# Patient Record
Sex: FEMALE | Race: WHITE | NOT HISPANIC OR LATINO | Employment: UNEMPLOYED | URBAN - METROPOLITAN AREA
[De-identification: names, ages, dates, MRNs, and addresses within clinical notes are randomized per-mention and may not be internally consistent; named-entity substitution may affect disease eponyms.]

---

## 2017-11-08 ENCOUNTER — HOSPITAL ENCOUNTER (EMERGENCY)
Facility: MEDICAL CENTER | Age: 52
End: 2017-11-08

## 2017-11-08 VITALS
DIASTOLIC BLOOD PRESSURE: 66 MMHG | OXYGEN SATURATION: 96 % | RESPIRATION RATE: 14 BRPM | SYSTOLIC BLOOD PRESSURE: 124 MMHG | HEART RATE: 73 BPM | TEMPERATURE: 98.1 F

## 2017-11-08 PROCEDURE — 302449 STATCHG TRIAGE ONLY (STATISTIC)

## 2019-03-10 ENCOUNTER — OFFICE VISIT (OUTPATIENT)
Dept: URGENT CARE | Facility: PHYSICIAN GROUP | Age: 54
End: 2019-03-10
Payer: COMMERCIAL

## 2019-03-10 VITALS
OXYGEN SATURATION: 97 % | SYSTOLIC BLOOD PRESSURE: 110 MMHG | HEART RATE: 82 BPM | RESPIRATION RATE: 16 BRPM | WEIGHT: 170 LBS | DIASTOLIC BLOOD PRESSURE: 80 MMHG | TEMPERATURE: 97.9 F

## 2019-03-10 DIAGNOSIS — J06.9 VIRAL URI WITH COUGH: ICD-10-CM

## 2019-03-10 PROCEDURE — 99203 OFFICE O/P NEW LOW 30 MIN: CPT | Performed by: PHYSICIAN ASSISTANT

## 2019-03-10 RX ORDER — BENZONATATE 100 MG/1
100 CAPSULE ORAL 3 TIMES DAILY PRN
Qty: 30 CAP | Refills: 0 | Status: SHIPPED | OUTPATIENT
Start: 2019-03-10 | End: 2020-03-19

## 2019-03-10 RX ORDER — FLUTICASONE PROPIONATE 50 MCG
1 SPRAY, SUSPENSION (ML) NASAL DAILY
Qty: 16 G | Refills: 0 | Status: SHIPPED | OUTPATIENT
Start: 2019-03-10 | End: 2020-03-19

## 2019-03-10 RX ORDER — GUAIFENESIN 600 MG/1
600 TABLET, EXTENDED RELEASE ORAL EVERY 12 HOURS
Qty: 28 TAB | Refills: 0 | Status: SHIPPED | OUTPATIENT
Start: 2019-03-10 | End: 2020-03-19

## 2019-03-10 ASSESSMENT — ENCOUNTER SYMPTOMS
COUGH: 1
FEVER: 0
CHILLS: 1
SHORTNESS OF BREATH: 0
SORE THROAT: 1
MYALGIAS: 0
RHINORRHEA: 1

## 2019-03-10 ASSESSMENT — COPD QUESTIONNAIRES: COPD: 0

## 2019-03-10 NOTE — PROGRESS NOTES
Subjective:   Esha Hatfield is a 54 y.o. female who presents for Cough (congestion,sore throat,x 4 days,headche)        Cough   This is a new problem. The current episode started in the past 7 days. The problem has been gradually worsening. The problem occurs constantly. The cough is non-productive. Associated symptoms include chills, nasal congestion, postnasal drip, rhinorrhea and a sore throat. Pertinent negatives include no ear congestion, ear pain, fever, myalgias or shortness of breath. Nothing aggravates the symptoms. Treatments tried: alkaseltzer cold and flu. The treatment provided mild relief. Her past medical history is significant for asthma. There is no history of bronchitis, COPD, emphysema or pneumonia.     Review of Systems   Constitutional: Positive for chills. Negative for fever.   HENT: Positive for postnasal drip, rhinorrhea and sore throat. Negative for ear pain.    Respiratory: Positive for cough. Negative for shortness of breath.    Musculoskeletal: Negative for myalgias.       PMH: denies chronic medical problems  MEDS:fluoxetine, daily  ALLERGIES:   Allergies   Allergen Reactions   • Pcn [Penicillins]      As a child     SURGHX: right knee surgery and cholecystectomy  SOCHX:  reports that she has never smoked. She has never used smokeless tobacco.  FH: Family history was reviewed, no pertinent findings to report   Objective:   /80 (BP Location: Left arm, Patient Position: Sitting, BP Cuff Size: Large adult)   Pulse 82   Temp 36.6 °C (97.9 °F) (Temporal)   Resp 16   Wt 77.1 kg (170 lb)   SpO2 97%   Physical Exam   Constitutional: Vital signs are normal. She appears well-developed and well-nourished.  Non-toxic appearance. No distress.   HENT:   Head: Normocephalic and atraumatic.   Right Ear: Tympanic membrane, external ear and ear canal normal.   Left Ear: Tympanic membrane, external ear and ear canal normal.   Nose: Mucosal edema and rhinorrhea present. Right sinus exhibits  maxillary sinus tenderness. Left sinus exhibits maxillary sinus tenderness.   Mouth/Throat: Uvula is midline and mucous membranes are normal. Posterior oropharyngeal erythema present. No tonsillar exudate.   Significant congestion.    Eyes: Lids are normal.   Neck: Neck supple.   Cardiovascular: Normal rate, regular rhythm and normal heart sounds.  Exam reveals no gallop and no friction rub.    No murmur heard.  Pulmonary/Chest: Effort normal and breath sounds normal. No respiratory distress. She has no wheezes. She has no rales.   Neurological: She is alert.   Skin: Skin is warm and dry. Capillary refill takes less than 2 seconds.   Psychiatric: She has a normal mood and affect. Her speech is normal and behavior is normal. Judgment and thought content normal. Cognition and memory are normal.   Vitals reviewed.        Assessment/Plan:   1. Viral URI with cough  - fluticasone (FLONASE) 50 MCG/ACT nasal spray; Spray 1 Spray in nose every day.  Dispense: 16 g; Refill: 0  - guaiFENesin LA (MUCINEX) 600 MG TABLET SR 12 HR; Take 1 Tab by mouth every 12 hours.  Dispense: 28 Tab; Refill: 0  - benzonatate (TESSALON) 100 MG Cap; Take 1 Cap by mouth 3 times a day as needed for Cough.  Dispense: 30 Cap; Refill: 0      VSS, no dyspnea, no SOB, and lungs CTA on PE.  Consistent with viral URI without lower respiratory involvement. Goals of care include symptomatic control and prevention of lower respiratory spread. Signs of lower respiratory involvement discussed with pt.  Pt instructed to RTC if any of these are observed.     Drink plenty of fluids and rest.  Flonase 1 squirt in each nostril, as instructed in clinic, once a day.  Use nasal saline TID to promote drainage.   Salt water gurgles to soothe sore throat.  Start OTC expectorant.  APAP for fever control, and ibuprofen for throat pain/headache relief prn.    Try to use sudafed sparingly in order to allow sinuses to drain.  Avoid the longer formulations and try to take only  in the morning and/or at noon if needed.  If you fail to improve in 2-4 days or symptoms worsen/new symptoms develop, RTC for reevaluation.    Differential diagnosis, natural history, supportive care, and indications for immediate follow-up discussed.

## 2019-03-21 ENCOUNTER — OFFICE VISIT (OUTPATIENT)
Dept: DERMATOLOGY | Facility: IMAGING CENTER | Age: 54
End: 2019-03-21
Payer: COMMERCIAL

## 2019-03-21 VITALS — TEMPERATURE: 98.5 F | BODY MASS INDEX: 29.16 KG/M2 | HEIGHT: 65 IN | WEIGHT: 175 LBS

## 2019-03-21 DIAGNOSIS — D18.01 CHERRY ANGIOMA: ICD-10-CM

## 2019-03-21 DIAGNOSIS — D22.9 NEVUS: ICD-10-CM

## 2019-03-21 DIAGNOSIS — L91.8 SKIN TAG: ICD-10-CM

## 2019-03-21 PROBLEM — J30.2 SEASONAL ALLERGIES: Status: ACTIVE | Noted: 2019-03-21

## 2019-03-21 PROCEDURE — 99202 OFFICE O/P NEW SF 15 MIN: CPT | Performed by: DERMATOLOGY

## 2019-03-21 RX ORDER — FLUOXETINE HYDROCHLORIDE 20 MG/1
CAPSULE ORAL
Refills: 0 | COMMUNITY
Start: 2019-03-12 | End: 2021-01-29

## 2020-01-19 NOTE — PROGRESS NOTES
CC: mole check    Subjective: new patient here for mole check - few sites of concern.     Also discuss skin tag removal  HPI/location: possible mole on back  Time present: many years  Painful lesion: No  Itching lesion: No  Enlarging lesion: No  Anything make it better or worse?    History of skin cancer: No  History of precancers/actinic keratoses: No  History of biopsies:No  History of blistering/severe sunburns:No  Family history of skin cancer: Grandmother  Family history of atypical moles: Grandmother  Wears sunprotection regularly.    ROS: no fevers/chills. No itch.    DermPMH: no skin cancer/melanoma  No problem-specific Assessment & Plan notes found for this encounter.    Relevant PMH:seasonal allergies  Social:nonsmoker    PE: Gen:WDWN female in NAD.  UBE - face - cherry red vascular papule on right upper foreline.  Eyes/lips/neck/arms/chest/back - without rashes or suspicious lesions noted.  Skin-colored protuberant tag appearing lesions on right neck and right breast.  Few scattered hyperpigmented macules/papules, benign in appearance on torso/arms    A/P: Nevi: benign appearing:  -Reviewed ABCDEs  -Reviewed skin cancer detection/prevention  -RTC PRN growth/changes/concerning features    Cherry angioma: benign, face  -b/r    Skin tags, right neck/right breast likely:  -counseled re: dx/tx  -cosmetics visit $150/10 lesions treated PRN    I have reviewed medications relevant to my specialty.    PRN    Cosmetics: procedure  Skin tags, right neck and right breast, likely:  -counseled on diagnosis and treatment, including risks/benefits/alternatives of cyrotherapy  -LN2 25 sec X 1-2 cycles X 2lesions  -f/u if persists        
none

## 2020-03-19 ENCOUNTER — OFFICE VISIT (OUTPATIENT)
Dept: URGENT CARE | Facility: PHYSICIAN GROUP | Age: 55
End: 2020-03-19
Payer: COMMERCIAL

## 2020-03-19 VITALS
BODY MASS INDEX: 29.16 KG/M2 | TEMPERATURE: 97.5 F | OXYGEN SATURATION: 96 % | WEIGHT: 175 LBS | DIASTOLIC BLOOD PRESSURE: 82 MMHG | HEIGHT: 65 IN | SYSTOLIC BLOOD PRESSURE: 118 MMHG | RESPIRATION RATE: 20 BRPM | HEART RATE: 86 BPM

## 2020-03-19 DIAGNOSIS — B00.1 COLD SORE: Primary | ICD-10-CM

## 2020-03-19 DIAGNOSIS — J34.89 INTERNAL NASAL LESION: ICD-10-CM

## 2020-03-19 PROCEDURE — 99214 OFFICE O/P EST MOD 30 MIN: CPT | Performed by: PHYSICIAN ASSISTANT

## 2020-03-19 RX ORDER — VALACYCLOVIR HYDROCHLORIDE 1 G/1
2000 TABLET, FILM COATED ORAL 2 TIMES DAILY
Qty: 4 TAB | Refills: 2 | Status: SHIPPED | OUTPATIENT
Start: 2020-03-19 | End: 2020-03-20

## 2020-03-19 ASSESSMENT — ENCOUNTER SYMPTOMS
VOMITING: 0
FEVER: 0
ABDOMINAL PAIN: 0
COUGH: 0
DIARRHEA: 0
CONSTIPATION: 0
CHILLS: 0
NAUSEA: 0
SHORTNESS OF BREATH: 0

## 2020-03-19 NOTE — PROGRESS NOTES
"Subjective:   Esha Hatfield is a 55 y.o. female who presents for Methicillin-Resistant Staphylococcus Aureus (MRSA) (staph infection in nose, sore, itching  xfew days, pt states she has hx of this)        HPI   The patient presents to urgent care today with a sore in the left nose for the past 4 days.  The sore is painful, no discharge or drainage.  No fever, chills.  She has had multiple episodes in the past treated with Valtrex and mupirocin.  No cough, congestion, sore throat.  She has not tried to treat the area.    Review of Systems   Constitutional: Negative for chills, fever and malaise/fatigue.   HENT:        Positive nasal sore   Respiratory: Negative for cough and shortness of breath.    Gastrointestinal: Negative for abdominal pain, constipation, diarrhea, nausea and vomiting.   All other systems reviewed and are negative.      PMH:  has no past medical history on file.  MEDS:   Current Outpatient Medications:   •  valacyclovir (VALTREX) 1 GM Tab, Take 2 Tabs by mouth 2 times a day for 1 day., Disp: 4 Tab, Rfl: 2  •  mupirocin (BACTROBAN) 2 % Ointment, Apply 1 Application to affected area(s) 2 times a day for 5 days., Disp: 1 Tube, Rfl: 1  •  FLUoxetine (PROZAC) 20 MG Cap, TAKE ONE CAPSULE BY MOUTH EVERY DAY . NEEDS APPOINTMENT, Disp: , Rfl: 0  ALLERGIES:   Allergies   Allergen Reactions   • Pcn [Penicillins]      As a child     SURGHX:   Past Surgical History:   Procedure Laterality Date   • BREAST IMPLANT REVISION       SOCHX:  reports that she has never smoked. She has never used smokeless tobacco.  History reviewed. No pertinent family history.     Objective:   /82 (BP Location: Right arm, Patient Position: Sitting, BP Cuff Size: Large adult)   Pulse 86   Temp 36.4 °C (97.5 °F) (Temporal)   Resp 20   Ht 1.651 m (5' 5\")   Wt 79.4 kg (175 lb)   SpO2 96%   BMI 29.12 kg/m²     Physical Exam  Vitals signs reviewed.   Constitutional:       General: She is not in acute distress.     Appearance: " She is well-developed.   HENT:      Head: Normocephalic and atraumatic.      Right Ear: External ear normal.      Left Ear: External ear normal.      Nose: Nose normal.        Mouth/Throat:      Mouth: Mucous membranes are moist.   Eyes:      Conjunctiva/sclera: Conjunctivae normal.      Pupils: Pupils are equal, round, and reactive to light.   Neck:      Musculoskeletal: Normal range of motion and neck supple.      Trachea: No tracheal deviation.   Cardiovascular:      Rate and Rhythm: Normal rate and regular rhythm.   Pulmonary:      Effort: Pulmonary effort is normal.      Breath sounds: Normal breath sounds.   Skin:     General: Skin is warm and dry.      Capillary Refill: Capillary refill takes less than 2 seconds.   Neurological:      General: No focal deficit present.      Mental Status: She is alert and oriented to person, place, and time.   Psychiatric:         Mood and Affect: Mood normal.         Behavior: Behavior normal.           Assessment/Plan:     1. Cold sore  valacyclovir (VALTREX) 1 GM Tab   2. Internal nasal lesion  mupirocin (BACTROBAN) 2 % Ointment     Supportive care reviewed.  The patient will swab the inside of the right nose.    follow-up with primary care provider within 7-10 days.  If symptoms worsen or persist patient can return to clinic for reevaluation. All side effects of medication discussed including allergic response, GI upset, tendon injury, etc. Patient confirmed understanding of information.    Please note that this dictation was created using voice recognition software. I have made every reasonable attempt to correct obvious errors, but I expect that there are errors of grammar and possibly content that I did not discover before finalizing the note.

## 2020-05-29 ENCOUNTER — TELEMEDICINE (OUTPATIENT)
Dept: SLEEP MEDICINE | Facility: MEDICAL CENTER | Age: 55
End: 2020-05-29
Payer: COMMERCIAL

## 2020-05-29 VITALS — HEIGHT: 65 IN | WEIGHT: 165 LBS | BODY MASS INDEX: 27.49 KG/M2

## 2020-05-29 DIAGNOSIS — G47.34 NOCTURNAL HYPOXIA: ICD-10-CM

## 2020-05-29 DIAGNOSIS — G47.33 OSA (OBSTRUCTIVE SLEEP APNEA): ICD-10-CM

## 2020-05-29 PROCEDURE — 99204 OFFICE O/P NEW MOD 45 MIN: CPT | Mod: 95,CR | Performed by: INTERNAL MEDICINE

## 2020-05-29 NOTE — PROGRESS NOTES
"Telemedicine Visit: New Patient     This encounter was conducted via Platform ZOOM.  Verbal consent was obtained. Patient's identity was verified.    Subjective:     CC: suspected sleep apnea    Esha Hatfield is a 55 y.o. female presenting to establish care and to discuss the evaluation and management of suspected sleep apnea syndrome.  She has had nonrestorative sleep which she initially attributed to household stressors.  She is caring for both her grandmother with dementia, as well as her grandchildren.  She underwent screening overnight oximetry showing cyclical desaturations to a melissa of 75% for 51% of the night.    In terms of sleep habits, she gets to bed by 11 PM, struggling to fall asleep.  Once aleep she tends to sleep throughout the remainder of the night.  She has been told that she snores.  If she sleeps on her back she may wake up with palpitations, however she tends to sleep on her side.  She wakes up by 6:30 AM, feeling unrested.  She feels that she is \"out of energy\".  Her BMI is>27.  Denies tobacco, alcohol or recreational drug use.        ROS  See HPI  Constitutional: Negative for fever, chills and malaise/fatigue.   HENT: Negative for congestion.    Eyes: Negative for pain.   Respiratory: Negative for cough and shortness of breath.    Cardiovascular: Negative for leg swelling.   Gastrointestinal: Negative for nausea, vomiting, abdominal pain and diarrhea.   Genitourinary: Negative for dysuria and hematuria.   Skin: Negative for rash.   Neurological: Negative for dizziness, focal weakness and headaches.   Endo/Heme/Allergies: Does not bruise/bleed easily.   Psychiatric/Behavioral: Negative for depression.  The patient is not nervous/anxious.      Allergies   Allergen Reactions   • Dilaudid [Hydromorphone Hcl]    • Pcn [Penicillins]      As a child       Current medicines (including changes today)  Current Outpatient Medications   Medication Sig Dispense Refill   • FLUoxetine (PROZAC) 20 MG Cap " "TAKE ONE CAPSULE BY MOUTH EVERY DAY . NEEDS APPOINTMENT  0     No current facility-administered medications for this visit.        She  has a past medical history of Asthma, Chickenpox, and GERD (gastroesophageal reflux disease).  She  has a past surgical history that includes breast implant revision and arthroscopy, knee.      Family History   Problem Relation Age of Onset   • Cancer Mother      Family Status   Relation Name Status   • Mo         Patient Active Problem List    Diagnosis Date Noted   • Seasonal allergies 2019          Objective:   Ht 1.651 m (5' 5\")   Wt 74.8 kg (165 lb)   BMI 27.46 kg/m²     Physical Exam:  Constitutional: Alert, no distress, well-groomed.  Skin: No rashes in visible areas.  Eye: Round. Conjunctiva clear, lids normal. No icterus.   ENMT: Lips pink without lesions, good dentition, moist mucous membranes. Phonation normal.  Neck: No masses, no thyromegaly. Moves freely without pain.  CV: Pulse as reported by patient  Respiratory: Unlabored respiratory effort, no cough or audible wheeze  Psych: Alert and oriented x3, normal affect and mood.       Assessment and Plan:   The following treatment plan was discussed:     1. GAMALIEL (obstructive sleep apnea)  - Overnight Home Sleep Study; Future    2. BMI 27.0-27.9,adult    3. Nocturnal hypoxia    The patient has snoring, nonrestorative sleep and daytime somnolence, in the setting of obesity, and evidence of nocturnal desaturations, with high clinical suspicion for obstructive sleep apnea.  We will proceed with polysomnography for evaluation.  Follow-up: Return for after sleep study.         "

## 2020-06-01 ENCOUNTER — HOSPITAL ENCOUNTER (OUTPATIENT)
Dept: RADIOLOGY | Facility: MEDICAL CENTER | Age: 55
End: 2020-06-01
Payer: COMMERCIAL

## 2020-06-23 ENCOUNTER — HOSPITAL ENCOUNTER (OUTPATIENT)
Dept: RADIOLOGY | Facility: MEDICAL CENTER | Age: 55
End: 2020-06-23
Attending: FAMILY MEDICINE
Payer: COMMERCIAL

## 2020-06-23 DIAGNOSIS — Z12.31 VISIT FOR SCREENING MAMMOGRAM: ICD-10-CM

## 2020-06-23 PROCEDURE — 77067 SCR MAMMO BI INCL CAD: CPT

## 2020-07-13 ENCOUNTER — HOME STUDY (OUTPATIENT)
Dept: SLEEP MEDICINE | Facility: MEDICAL CENTER | Age: 55
End: 2020-07-13
Attending: INTERNAL MEDICINE
Payer: COMMERCIAL

## 2020-07-13 DIAGNOSIS — G47.33 OSA (OBSTRUCTIVE SLEEP APNEA): ICD-10-CM

## 2020-07-13 PROCEDURE — 95806 SLEEP STUDY UNATT&RESP EFFT: CPT | Performed by: FAMILY MEDICINE

## 2020-07-15 ENCOUNTER — TELEMEDICINE (OUTPATIENT)
Dept: SLEEP MEDICINE | Facility: MEDICAL CENTER | Age: 55
End: 2020-07-15
Payer: COMMERCIAL

## 2020-07-15 VITALS — WEIGHT: 175 LBS | BODY MASS INDEX: 29.16 KG/M2 | HEIGHT: 65 IN | TEMPERATURE: 97.5 F

## 2020-07-15 DIAGNOSIS — G47.34 NOCTURNAL HYPOXIA: ICD-10-CM

## 2020-07-15 DIAGNOSIS — G47.33 OSA (OBSTRUCTIVE SLEEP APNEA): ICD-10-CM

## 2020-07-15 PROCEDURE — 99213 OFFICE O/P EST LOW 20 MIN: CPT | Mod: 95,CR | Performed by: NURSE PRACTITIONER

## 2020-07-15 NOTE — PROGRESS NOTES
Telemedicine Visit: Established Patient     This encounter was conducted via Zoom .   Verbal consent was obtained. Patient's identity was verified.  Given the importance of social distancing and other strategies recommended to reduce the risk of COVID-19 transmission, I am providing medical care to this patient via audio/video visit in place of an in person visit at the request of the patient.      Subjective:   CC: GAMALIEL and sleep study results.     Esha Hatfield is a 55 y.o. female presenting for evaluation and management of GAMALIEL and sleep study results. PMH includes seasonal allergies, GAMALIEL, nocturnal hypoxia.    HSS from 7/13/20 was reviewed which indicated moderate obstructive sleep apnea with  Respiratory Event Index (ANUPAMA) of 18.2 per hour. Apneas: 3 (Obstructive apnea index 0.3/hr, Central apnea index 0.3 /hr, mixed 0.3 /hour), Hypopneas: 55. O2 Sat. melissa was 81%, avg O2 was 91 % and baseline O2 at 96 %. O2 sat was below 89% for 24 min of the flow evaluation time. Avg HR 67 BPM.    She goes to sleep between 10-11 pm and wakes up between 6-6:30 am.  She is able to sleep through the night however does wake up frequently in the morning feeling very tired.  Some mornings she feels as if she did not sleep at all through the night.  She denies morning headache but does report snoring.  She denies any new health problems or medications.    ROS   Denies any recent fevers or chills. No nausea or vomiting. No chest pains or shortness of breath.     Allergies   Allergen Reactions   • Dilaudid [Hydromorphone Hcl]    • Pcn [Penicillins]      As a child       Current medicines (including changes today)  Current Outpatient Medications   Medication Sig Dispense Refill   • FLUoxetine (PROZAC) 20 MG Cap TAKE ONE CAPSULE BY MOUTH EVERY DAY . NEEDS APPOINTMENT  0     No current facility-administered medications for this visit.        Patient Active Problem List    Diagnosis Date Noted   • Seasonal allergies 03/21/2019       Family  "History   Problem Relation Age of Onset   • Cancer Mother        She  has a past medical history of Asthma, Chickenpox, and GERD (gastroesophageal reflux disease).  She  has a past surgical history that includes breast implant revision and arthroscopy, knee.       Objective:   Temp 36.4 °C (97.5 °F)   Ht 1.651 m (5' 5\")   Wt 79.4 kg (175 lb)   BMI 29.12 kg/m²     Physical Exam  Constitutional: Alert, no distress, well-groomed.  Skin: No rashes in visible areas.  Eye: Round. Conjunctiva clear, lids normal. No icterus.   ENMT: Lips pink without lesions, good dentition, moist mucous membranes. Phonation normal.  Neck: Moves freely without pain.  CV: Pulse as reported by patient  Respiratory: Unlabored respiratory effort, no cough or audible wheeze  Psych: Alert and oriented x3, normal affect and mood.       Assessment and Plan:   The following treatment plan was discussed:     1. GAMALIEL (obstructive sleep apnea)  - DME CPAP    2. Nocturnal hypoxia    3. BMI 29.0-29.9,adult    1. HSS from 7/13/20 was reviewed which indicated moderate obstructive sleep apnea with  Respiratory Event Index (ANUPAMA) of 18.2 per hour. Apneas: 3 (Obstructive apnea index 0.3/hr, Central apnea index 0.3 /hr, mixed 0.3 /hour), Hypopneas: 55. O2 Sat. melissa was 81%, avg O2 was 91 % and baseline O2 at 96 %. O2 sat was below 89% for 24 min of the flow evaluation time. Avg HR 67 BPM.  Recommendation:  CPAP titration study vs Auto CPAP trial.   In general patients with sleep apnea are advised to avoid alcohol and sedatives and to not operate a motor vehicle while drowsy. In some cases alternative treatment options may prove effective in resolving sleep apnea in these options include upper airway surgery, the use of a dental orthotic or weight loss and positional therapy. Clinical correlation is required. These were reviewed with the patient.     2.  Patient is amenable to CPAP trial.  DME order for auto CPAP 5-15 cmH2O, mask (MOC) and supplies was " provided today. Continue to clean mask and supplies weekly, and change supplies per insurance guidelines.   3. Continue to stay active.   4. Follow up with the appropriate healthcare practitioners for all other medical problems and issues.      Follow-up: Return in about 3 months (around 10/15/2020).     CAMILLA Hugo.

## 2020-07-15 NOTE — PROCEDURES
DIAGNOSTIC HOME SLEEP TEST (HST) REPORT       PATIENT ID:  NAME:  Esha Hatfield  MRN:               [id number]  YOB: 1965  DATE OF STUDY: 7/13/2020      Impression:     This study shows evidence of:     1.Moderate obstructive sleep apnea with  Respiratory Event Index (ANUPAMA) of 18.2 per hour . These findings are based on the recording time (flow evaluation time).     2.  O2 Sat. melissa was 81%, avg O2 was 91 % and baseline O2 at 96 %. O2 sat was below 89% for 24 min of the flow evaluation time. Avg HR 67 BPM.      TECHNICAL DESCRIPTION:  ResMed Device used was a type-III home study device. Home sleep study recording included: Airflow recording by nasal pressure transducer; Respiratory Effort by abdominal Respiratory Bands; O2 by finger oximetry. A position sensor and a snore channel was also used.    Scoring Criteria: A modification of the the AASM Manual for the Scoring of Sleep and Associated Events. Obstructive apnea was scored by cessation of airflow for at least 10 seconds with continuing respiratory effort.  Central apnea was scored by cessation of airflow for at least 10 seconds with no effort.  Hypopnea was scored by a 30% or more reduction in airflow for at least 10 seconds accompanied by an arterial oxygen desaturation of 3% or more.  (For Medicare patients, hypopneas were scored by a 30% or more reduction in airflow for at least 10 seconds accompanied by an arterial oxygen saturation of 4% or more, as required by their insurance, CMS.        General sleep summary: . Total recording time is 202 minutes and total flow evaluation time is 191 minutes. The patient spent 76 minutes in the supine position.    Respiratory events:    Apneas: 3 (Obstructive apnea index 0.3/hr, Central apnea index 0.3 /hr, mixed 0.3 /hour)  Hypopneas: 55    Recommendations:    1. CPAP titration study vs Auto CPAP trial .   2.   In general patients with sleep apnea are advised to avoid alcohol and sedatives and to  not operate a motor vehicle while drowsy. In some cases alternative treatment options may prove effective in resolving sleep apnea in these options include upper airway surgery, the use of a dental orthotic or weight loss and positional therapy. Clinical correlation is required.         Chapis Reed MD

## 2020-09-16 ENCOUNTER — TELEPHONE (OUTPATIENT)
Dept: PULMONOLOGY | Facility: HOSPICE | Age: 55
End: 2020-09-16

## 2020-09-16 NOTE — TELEPHONE ENCOUNTER
Debora from [facility] called and stated that this patient is not in network with [facility].  Thank you.

## 2021-01-04 ENCOUNTER — TELEMEDICINE (OUTPATIENT)
Dept: SLEEP MEDICINE | Facility: MEDICAL CENTER | Age: 56
End: 2021-01-04
Payer: COMMERCIAL

## 2021-01-04 VITALS — HEIGHT: 66 IN | WEIGHT: 185 LBS | BODY MASS INDEX: 29.73 KG/M2

## 2021-01-04 DIAGNOSIS — G47.33 OSA (OBSTRUCTIVE SLEEP APNEA): ICD-10-CM

## 2021-01-04 PROCEDURE — 99213 OFFICE O/P EST LOW 20 MIN: CPT | Mod: 95,CR | Performed by: FAMILY MEDICINE

## 2021-01-04 NOTE — PROGRESS NOTES
Telemedicine Visit: Established Patient     This encounter was conducted via Zoom. Verbal consent was obtained. Patient's identity was verified.       Given the importance of social distancing and other strategies recommended to reduce the risk of COVID-19 transmission, I am providing medical care to this patient via audio/video visit in place of an in person visit at the request of the patient.    [facility] Follow Up Note     Date: 1/4/2021 / Time: 8:13 AM    Patient ID:   Name:             Esha Hatfield   YOB: 1965  Age:                 55 y.o.  female   MRN:               [id number]      Thank you for requesting a sleep medicine consultation on Esha Hatfield at the sleep center. She presents today with the chief complaints of GAMALIEL and 1st complianace follow up.     HISTORY OF PRESENT ILLNESS:       Pt is currently on ACPAP 5-15 cm. She goes to sleep around 10-11 pm and wakes up around 6-7 am. She is getting about 7 hrs of sleep on a good night and about 6 hr of sleep on a bad night. Overall,  She does finds her sleep refreshing since she has been on the CPAP. She molina not take regular naps. She denies any symptoms of RLS, narcolepsy or any symptoms to suggest parasomnias such as nightmares, sleep walking or acting out of dreams.She is using CPAP most days of the week. Pt reports 5 hrs of average nightly use of CPAP. Pt denies snoring, gasping,choking.Pt also denies significant mask leak that is interfering with sleep. The 30 day compliance was downloaded which shows adequate compliance with more that 4 hr usage about 80%. The AHI is has improved to 0.7/hr. The mask leak is normal. The symptoms of GAMALIEL has improved with the PAP therapy.     SLEEP HISTORY   HSS from 7/13/20 was reviewed which indicated moderate obstructive sleep apnea with  Respiratory Event Index (ANUPAMA) of 18.2 per hour. Apneas: 3 (Obstructive apnea index 0.3/hr, Central apnea index 0.3 /hr, mixed 0.3 /hour),  "Hypopneas: 55. O2 Sat. melissa was 81%, avg O2 was 91 % and baseline O2 at 96 %. O2 sat was below 89% for 24 min of the flow evaluation time. Avg HR 67 BPM.      REVIEW OF SYSTEMS:       Constitutional: Denies fevers, Denies weight changes  Eyes: Denies changes in vision, no eye pain  Ears/Nose/Throat/Mouth: Denies nasal congestion or sore throat   Cardiovascular: Denies chest pain or palpitations   Respiratory: Denies shortness of breath , Denies cough  Gastrointestinal/Hepatic: Denies abdominal pain, nausea, vomiting,   Musculoskeletal/Rheum: Denies  joint pain and swelling   Skin/Breast: Denies rash,   Neurological: Denies headache, confusion,   Sleep: denies snoring     Comprehensive review of systems form is reviewed with the patient and is attached in the EMR.     PMH:  has a past medical history of Asthma, Chickenpox, and GERD (gastroesophageal reflux disease).  MEDS:   Current Outpatient Medications:   •  FLUoxetine (PROZAC) 20 MG Cap, TAKE ONE CAPSULE BY MOUTH EVERY DAY . NEEDS APPOINTMENT, Disp: , Rfl: 0  ALLERGIES:   Allergies   Allergen Reactions   • Dilaudid [Hydromorphone Hcl]    • Pcn [Penicillins]      As a child     SURGHX:   Past Surgical History:   Procedure Laterality Date   • ARTHROSCOPY, KNEE     • BREAST IMPLANT REVISION       SOCHX:  reports that she has never smoked. She has never used smokeless tobacco. She reports current alcohol use. She reports that she does not use drugs..  FH:   Family History   Problem Relation Age of Onset   • Cancer Mother          Physical Exam:  Vitals/ General Appearance:   Weight/BMI: Body mass index is 29.86 kg/m².  Ht 1.676 m (5' 6\")   Wt 83.9 kg (185 lb)   Vitals:    01/04/21 0813   Weight: 83.9 kg (185 lb)   Height: 1.676 m (5' 6\")       Pt. is alert and oriented to time, place and person. Cooperative and in no apparent distress.       Constitutional: Alert, no distress, well-groomed.  Skin: No rashes in visible areas.  Eye: Round. Conjunctiva clear, lids " normal. No icterus.   ENMT: Lips pink without lesions, good dentition, moist mucous membranes. Phonation normal.  Neck: No masses, no thyromegaly. Moves freely without pain.  CV: Pulse as reported by patient  Respiratory: Unlabored respiratory effort, no cough or audible wheeze  Psych: Alert and oriented x3, normal affect and mood.     ASSESSMENT AND PLAN     1. Sleep Apnea .     The pathophysiology of sleep anea and the increased risk of cardiovascular morbidity from untreated sleep apnea is discussed in detail with the patient.     She is urged to avoid supine sleep, weight gain and alcoholic beverages since all of these can worsen sleep apnea. She is cautioned against drowsy driving. If She feels sleepy while driving, She must pull over for a break/nap, rather than persist on the road, in the interest of She own safety and that of others on the road.   Plan   - Continue ACPAP 5-15 cm with nasal pillow mask    - compliance download was reviewed and discussed with the pt   - compliance was reinforced     2. Regarding treatment of other past medical problems and general health maintenance,  She is urged to follow up with PCP.

## 2021-01-04 NOTE — PATIENT INSTRUCTIONS
"CPAP and BPAP Information  CPAP and BPAP are methods of helping a person breathe with the use of air pressure. CPAP stands for \"continuous positive airway pressure.\" BPAP stands for \"bi-level positive airway pressure.\" In both methods, air is blown through your nose or mouth and into your air passages to help you breathe well.  CPAP and BPAP use different amounts of pressure to blow air. With CPAP, the amount of pressure stays the same while you breathe in and out. With BPAP, the amount of pressure is increased when you breathe in (inhale) so that you can take larger breaths. Your health care provider will recommend whether CPAP or BPAP would be more helpful for you.  Why are CPAP and BPAP treatments used?  CPAP or BPAP can be helpful if you have:  · Sleep apnea.  · Chronic obstructive pulmonary disease (COPD).  · Heart failure.  · Medical conditions that weaken the muscles of the chest including muscular dystrophy, or neurological diseases such as amyotrophic lateral sclerosis (ALS).  · Other problems that cause breathing to be weak, abnormal, or difficult.  CPAP is most commonly used for obstructive sleep apnea (GAMALIEL) to keep the airways from collapsing when the muscles relax during sleep.  How is CPAP or BPAP administered?  Both CPAP and BPAP are provided by a small machine with a flexible plastic tube that attaches to a plastic mask. You wear the mask. Air is blown through the mask into your nose or mouth. The amount of pressure that is used to blow the air can be adjusted on the machine. Your health care provider will determine the pressure setting that should be used based on your individual needs.  When should CPAP or BPAP be used?  In most cases, the mask only needs to be worn during sleep. Generally, the mask needs to be worn throughout the night and during any daytime naps. People with certain medical conditions may also need to wear the mask at other times when they are awake. Follow instructions from your " health care provider about when to use the machine.  What are some tips for using the mask?    · Because the mask needs to be snug, some people feel trapped or closed-in (claustrophobic) when first using the mask. If you feel this way, you may need to get used to the mask. One way to do this is by holding the mask loosely over your nose or mouth and then gradually applying the mask more snugly. You can also gradually increase the amount of time that you use the mask.  · Masks are available in various types and sizes. Some fit over your mouth and nose while others fit over just your nose. If your mask does not fit well, talk with your health care provider about getting a different one.  · If you are using a mask that fits over your nose and you tend to breathe through your mouth, a chin strap may be applied to help keep your mouth closed.  · The CPAP and BPAP machines have alarms that may sound if the mask comes off or develops a leak.  · If you have trouble with the mask, it is very important that you talk with your health care provider about finding a way to make the mask easier to tolerate. Do not stop using the mask. Stopping the use of the mask could have a negative impact on your health.  What are some tips for using the machine?  · Place your CPAP or BPAP machine on a secure table or stand near an electrical outlet.  · Know where the on/off switch is located on the machine.  · Follow instructions from your health care provider about how to set the pressure on your machine and when you should use it.  · Do not eat or drink while the CPAP or BPAP machine is on. Food or fluids could get pushed into your lungs by the pressure of the CPAP or BPAP.  · Do not smoke. Tobacco smoke residue can damage the machine.  · For home use, CPAP and BPAP machines can be rented or purchased through home health care companies. Many different brands of machines are available. Renting a machine before purchasing may help you find out  which particular machine works well for you.  · Keep the CPAP or BPAP machine and attachments clean. Ask your health care provider for specific instructions.  Get help right away if:  · You have redness or open areas around your nose or mouth where the mask fits.  · You have trouble using the CPAP or BPAP machine.  · You cannot tolerate wearing the CPAP or BPAP mask.  · You have pain, discomfort, and bloating in your abdomen.  Summary  · CPAP and BPAP are methods of helping a person breathe with the use of air pressure.  · Both CPAP and BPAP are provided by a small machine with a flexible plastic tube that attaches to a plastic mask.  · If you have trouble with the mask, it is very important that you talk with your health care provider about finding a way to make the mask easier to tolerate.  This information is not intended to replace advice given to you by your health care provider. Make sure you discuss any questions you have with your health care provider.  Document Released: 09/15/2005 Document Revised: 04/08/2020 Document Reviewed: 11/06/2017  Elsevier Patient Education © 2020 [employer]

## 2021-01-29 ENCOUNTER — OFFICE VISIT (OUTPATIENT)
Dept: DERMATOLOGY | Facility: IMAGING CENTER | Age: 56
End: 2021-01-29
Payer: COMMERCIAL

## 2021-01-29 ENCOUNTER — HOSPITAL ENCOUNTER (OUTPATIENT)
Facility: MEDICAL CENTER | Age: 56
End: 2021-01-29
Attending: DERMATOLOGY
Payer: COMMERCIAL

## 2021-01-29 DIAGNOSIS — R20.9 SENSATION DISTURBANCE OF SKIN: ICD-10-CM

## 2021-01-29 DIAGNOSIS — L72.3 SEBACEOUS CYST: Primary | ICD-10-CM

## 2021-01-29 DIAGNOSIS — L72.3 SEBACEOUS CYST: ICD-10-CM

## 2021-01-29 PROCEDURE — 87077 CULTURE AEROBIC IDENTIFY: CPT

## 2021-01-29 PROCEDURE — 99213 OFFICE O/P EST LOW 20 MIN: CPT | Performed by: DERMATOLOGY

## 2021-01-29 PROCEDURE — 87070 CULTURE OTHR SPECIMN AEROBIC: CPT

## 2021-01-29 RX ORDER — DOXYCYCLINE HYCLATE 100 MG
100 TABLET ORAL 2 TIMES DAILY
Qty: 28 TAB | Refills: 0 | Status: SHIPPED | OUTPATIENT
Start: 2021-01-29

## 2021-01-29 NOTE — PROGRESS NOTES
CC: Back Cyst      Subjective: Previously seen patient here for cyst on back, prev drained.  Had had increasing pain until contents reduced.  Hx of clindamycin, ABX to treat, not fully responsive.    HPI/location: Back, drained x 3 by PCP      ROS: no fevers/chills. No itch.  No cough  DermPMH: no skin cancer/melanoma  No problem-specific Assessment & Plan notes found for this encounter.    Relevant PMH: seasonal allergies  Social: never smoker    PE: Gen:WDWN female in NAD.  Skin: back - midline cystic nodule 1-2 cm, soft and nonfluctuant, mildly pink overlying.  Clear punctum, expresses keratinaceous debris with lateral light pressure.  Small <0.8cm nontender papule on left shoulder, with overlying punctum    A/P: cyst, sensation change-pain, midline back:  -counseled re dx/tx  -doxycycline 100mg PO BID X 14 days to reduce inflammation. Se reviewed  -PA for future surgery to treat    Cyst, left shoulder:  -counseled re: dx/tx  -warm compresses  -f/u PRN    -f/u PRN elective surgery      I have reviewed medications relevant to my specialty.

## 2021-02-01 LAB
BACTERIA WND AEROBE CULT: ABNORMAL
BACTERIA WND AEROBE CULT: ABNORMAL
SIGNIFICANT IND 70042: ABNORMAL
SITE SITE: ABNORMAL
SOURCE SOURCE: ABNORMAL

## 2021-02-25 ENCOUNTER — OFFICE VISIT (OUTPATIENT)
Dept: DERMATOLOGY | Facility: IMAGING CENTER | Age: 56
End: 2021-02-25
Payer: COMMERCIAL

## 2021-02-25 VITALS
WEIGHT: 188 LBS | TEMPERATURE: 98.8 F | DIASTOLIC BLOOD PRESSURE: 82 MMHG | SYSTOLIC BLOOD PRESSURE: 128 MMHG | HEIGHT: 65 IN | BODY MASS INDEX: 31.32 KG/M2

## 2021-02-25 DIAGNOSIS — R20.9 DISTURBANCE OF SKIN SENSATION: ICD-10-CM

## 2021-02-25 DIAGNOSIS — L72.3 SEBACEOUS CYST: ICD-10-CM

## 2021-02-25 PROCEDURE — 12032 INTMD RPR S/A/T/EXT 2.6-7.5: CPT | Performed by: DERMATOLOGY

## 2021-02-25 PROCEDURE — 11402 EXC TR-EXT B9+MARG 1.1-2 CM: CPT | Performed by: DERMATOLOGY

## 2021-02-25 NOTE — PROGRESS NOTES
"BENIGN EXCISION PROCEDURE NOTE:    Risks, benefits and alternatives of procedure, including, but not limited to scar/poor cosmetic outcome, bleeding, pain, infection, nerve damage, recurrence of lesion, failed surgery, and need for further surgery, were discussed and written informed consent obtained. Verbal time out completed.     Allergies reviewed: Yes  Pacemaker/defibrillator: No  Artificial joints: No  Antibiotics given: No   Blood thinners/anticoagulants: No    Pre-op diagnosis: cyst/R20.9 (disturbance of skin)  Post-op diagnosis: Same  Indication: symptomatic pain, recurrent     Site:back   Pre-op size:1.5cm  Post-op antibiotics: no    /82   Temp 37.1 °C (98.8 °F) (Temporal)   Ht 1.651 m (5' 5\")   Wt 85.3 kg (188 lb)     Procedure: Area of surgery was prepped with alcohol, marked with a sterile marking pen. Anesthesia with 1% lidocaine with epinephrine administered with 30 gauge needle. The area was again cleaned with chlorhexidine swab. With sterile technique, a 10- blade scalpel was used to make a elliptical incision over the lesion to the level of the subcutaneous fat. Dissection was completed with iris/tissue scissors, and the mass was removed. Hemostasis was achieved with electrodessication.  Specimen was placed into biopsy container and sent to pathology by staff.    Intermediate closure note:  3.0 monocryl buried vertical mattress sutures were placed to close dead space. 4.0 prolene superficial running sutures were placed to approximate wound edges.  Vaseline applied to wound with bandage. Patient tolerated procedure well and there were no complications, blood loss was minimal.     Final wound size: 4.5 cm    Bandage was placed with vaseline, telfa, gauze and tape. Wound care was discussed with the patient, and written instructions were provided. Patient to return to clinic in 10-14 days for suture removal. Patient to call us if any problems or concerns with the procedure site arise prior to " scheduled appointment.    Starr Hawkins MD

## 2021-03-04 ENCOUNTER — TELEPHONE (OUTPATIENT)
Dept: DERMATOLOGY | Facility: IMAGING CENTER | Age: 56
End: 2021-03-04

## 2021-03-04 NOTE — TELEPHONE ENCOUNTER
LVM for patient to return my call at Dermatology. Wanted to discuss pathology report from [facility] with Dr. Hawkins on 2/25/21.

## 2021-03-04 NOTE — TELEPHONE ENCOUNTER
Spoke to patient about pathology results about cyst excision. It was excised and patient was understandable and had no further questions.    She stated the end of one of her sutures had popped open so I scheduled her for the MA schedule so we could place steri strips till her sutures are to be removed.

## 2021-03-05 ENCOUNTER — NON-PROVIDER VISIT (OUTPATIENT)
Dept: DERMATOLOGY | Facility: IMAGING CENTER | Age: 56
End: 2021-03-05
Payer: COMMERCIAL

## 2021-03-11 ENCOUNTER — APPOINTMENT (OUTPATIENT)
Dept: DERMATOLOGY | Facility: IMAGING CENTER | Age: 56
End: 2021-03-11
Payer: COMMERCIAL

## 2022-12-02 ENCOUNTER — APPOINTMENT (OUTPATIENT)
Dept: URGENT CARE | Facility: PHYSICIAN GROUP | Age: 57
End: 2022-12-02
Payer: COMMERCIAL

## 2022-12-09 ENCOUNTER — OFFICE VISIT (OUTPATIENT)
Dept: DERMATOLOGY | Facility: IMAGING CENTER | Age: 57
End: 2022-12-09
Payer: COMMERCIAL

## 2022-12-09 DIAGNOSIS — L91.8 INFLAMED ACROCHORDON: ICD-10-CM

## 2022-12-09 DIAGNOSIS — D18.01 CHERRY ANGIOMA: ICD-10-CM

## 2022-12-09 DIAGNOSIS — D22.9 NEVUS: ICD-10-CM

## 2022-12-09 PROCEDURE — 17110 DESTRUCTION B9 LES UP TO 14: CPT | Performed by: DERMATOLOGY

## 2022-12-09 PROCEDURE — 99212 OFFICE O/P EST SF 10 MIN: CPT | Mod: 25 | Performed by: DERMATOLOGY

## 2022-12-09 NOTE — PROGRESS NOTES
CC: Possible skin tags under left axilla and lower back     Subjective: Pt here today for possible skin tags under left axilla that's bothered by bra and lower back x2      ROS: no fevers/chills. No itch.  No cough  DermPMH: no skin cancer/melanoma  No problem-specific Assessment & Plan notes found for this encounter.    Relevant PMH: seasonal allergies  Social: never smoker    PE: Gen:WDWN female in NAD.  Skin: skin-colored, little pink surrounding, tag appearing papule on left axillary chest wall. Cherry red papule on lower left back. Skin colored papule on lower right back, appearing benign    A/P: ISK:  -LN2 20 sec X 2 cycles X 1lesion  -f/u PRN    Angioma/nevus, back:  -b/r    F/u PRN    I have reviewed medications relevant to my specialty.

## 2024-07-12 ENCOUNTER — OFFICE VISIT (OUTPATIENT)
Dept: DERMATOLOGY | Facility: IMAGING CENTER | Age: 59
End: 2024-07-12
Payer: COMMERCIAL

## 2024-07-12 DIAGNOSIS — L81.4 LENTIGO: ICD-10-CM

## 2024-07-12 DIAGNOSIS — D22.9 NEVUS: ICD-10-CM

## 2024-07-12 DIAGNOSIS — L82.1 SEBORRHEIC KERATOSIS: ICD-10-CM

## 2024-07-12 DIAGNOSIS — D18.01 CHERRY ANGIOMA: ICD-10-CM

## 2024-07-12 DIAGNOSIS — Z12.83 SKIN CANCER SCREENING: ICD-10-CM

## 2024-07-12 DIAGNOSIS — L90.8 SKIN AGING: ICD-10-CM

## 2024-07-12 PROCEDURE — 99212 OFFICE O/P EST SF 10 MIN: CPT | Performed by: DERMATOLOGY

## 2024-10-28 ENCOUNTER — OFFICE VISIT (OUTPATIENT)
Dept: URGENT CARE | Facility: PHYSICIAN GROUP | Age: 59
End: 2024-10-28
Payer: COMMERCIAL

## 2024-10-28 VITALS
OXYGEN SATURATION: 93 % | RESPIRATION RATE: 17 BRPM | HEART RATE: 100 BPM | WEIGHT: 219.14 LBS | DIASTOLIC BLOOD PRESSURE: 80 MMHG | SYSTOLIC BLOOD PRESSURE: 116 MMHG | HEIGHT: 65 IN | TEMPERATURE: 97.7 F | BODY MASS INDEX: 36.51 KG/M2

## 2024-10-28 DIAGNOSIS — L72.3 INFECTED SEBACEOUS CYST: ICD-10-CM

## 2024-10-28 DIAGNOSIS — L08.9 INFECTED SEBACEOUS CYST: ICD-10-CM

## 2024-10-28 PROCEDURE — 10061 I&D ABSCESS COMP/MULTIPLE: CPT | Performed by: FAMILY MEDICINE

## 2024-10-28 PROCEDURE — 3074F SYST BP LT 130 MM HG: CPT | Performed by: FAMILY MEDICINE

## 2024-10-28 PROCEDURE — 3079F DIAST BP 80-89 MM HG: CPT | Performed by: FAMILY MEDICINE

## 2024-10-28 RX ORDER — SULFAMETHOXAZOLE AND TRIMETHOPRIM 800; 160 MG/1; MG/1
1 TABLET ORAL EVERY 12 HOURS
Qty: 10 TABLET | Refills: 0 | Status: SHIPPED | OUTPATIENT
Start: 2024-10-28 | End: 2024-11-02

## 2024-10-28 ASSESSMENT — ENCOUNTER SYMPTOMS: BRUISES/BLEEDS EASILY: 0

## 2024-12-15 ENCOUNTER — OFFICE VISIT (OUTPATIENT)
Dept: URGENT CARE | Facility: PHYSICIAN GROUP | Age: 59
End: 2024-12-15
Payer: COMMERCIAL

## 2024-12-15 VITALS
WEIGHT: 221.12 LBS | HEART RATE: 92 BPM | HEIGHT: 65 IN | SYSTOLIC BLOOD PRESSURE: 114 MMHG | DIASTOLIC BLOOD PRESSURE: 78 MMHG | TEMPERATURE: 97.2 F | RESPIRATION RATE: 19 BRPM | BODY MASS INDEX: 36.84 KG/M2 | OXYGEN SATURATION: 96 %

## 2024-12-15 DIAGNOSIS — J01.90 ACUTE BACTERIAL RHINOSINUSITIS: ICD-10-CM

## 2024-12-15 DIAGNOSIS — B96.89 ACUTE BACTERIAL RHINOSINUSITIS: ICD-10-CM

## 2024-12-15 PROCEDURE — 3078F DIAST BP <80 MM HG: CPT

## 2024-12-15 PROCEDURE — 3074F SYST BP LT 130 MM HG: CPT

## 2024-12-15 PROCEDURE — 99213 OFFICE O/P EST LOW 20 MIN: CPT

## 2024-12-15 RX ORDER — DOXYCYCLINE HYCLATE 100 MG
100 TABLET ORAL 2 TIMES DAILY
Qty: 14 TABLET | Refills: 0 | Status: SHIPPED | OUTPATIENT
Start: 2024-12-15

## 2024-12-15 RX ORDER — FLUTICASONE PROPIONATE 50 MCG
1 SPRAY, SUSPENSION (ML) NASAL DAILY
Qty: 16 G | Refills: 0 | Status: SHIPPED | OUTPATIENT
Start: 2024-12-15

## 2024-12-15 ASSESSMENT — ENCOUNTER SYMPTOMS
SINUS PAIN: 1
CHILLS: 0
COUGH: 0
SINUS PRESSURE: 1
HEADACHES: 0
SWOLLEN GLANDS: 1
SORE THROAT: 0
DIAPHORESIS: 0
SHORTNESS OF BREATH: 0
NECK PAIN: 0
HOARSE VOICE: 0

## 2024-12-15 NOTE — PROGRESS NOTES
Subjective:   Esha Hatfield is a 59 y.o. female who presents for Sinus Problem (Sinus pressure, nasal congestion X 1 week. States she believes she had a cold and now has progressed into sinus problem.)      Patient presents with complaints of sinus pain and pressure, congestion, rhinorrhea for the last week.  Patient states that she initially thought she had a cold and had been treating symptoms with OTC medications and watchful waiting.  Symptoms were not improving patient did think that maybe this was more allergy related so she started taking antihistamine, though symptoms persisted and she is now having marked sinus pain and pressure and rhinorrhea.  She denies any fever, chills, body aches    Sinus Problem  This is a new problem. The current episode started 1 to 4 weeks ago. The problem has been gradually worsening since onset. There has been no fever. The fever has been present for Less than 1 day. Her pain is at a severity of 5/10. The pain is moderate. Associated symptoms include congestion, sinus pressure and swollen glands. Pertinent negatives include no chills, coughing, diaphoresis, ear pain, headaches, hoarse voice, neck pain, shortness of breath, sneezing or sore throat. Past treatments include oral decongestants, nasal decongestants and acetaminophen. The treatment provided mild relief.       Review of Systems   Constitutional:  Negative for chills and diaphoresis.   HENT:  Positive for congestion, sinus pressure and sinus pain. Negative for ear pain, hoarse voice, sneezing and sore throat.    Respiratory:  Negative for cough and shortness of breath.    Musculoskeletal:  Negative for neck pain.   Neurological:  Negative for headaches.     Refer to HPI for additional details.    During this visit, appropriate PPE was worn, and hand hygiene was performed.    PMH:  has a past medical history of Asthma, Chickenpox, and GERD (gastroesophageal reflux disease).    MEDS:   Current Outpatient Medications:  "    doxycycline (VIBRAMYCIN) 100 MG Tab, Take 1 Tablet by mouth 2 times a day., Disp: 14 Tablet, Rfl: 0    fluticasone (FLONASE) 50 MCG/ACT nasal spray, Administer 1 Spray into affected nostril(S) every day., Disp: 16 g, Rfl: 0    ALLERGIES:   Allergies   Allergen Reactions    Dilaudid [Hydromorphone Hcl]     Pcn [Penicillins]      As a child     SURGHX:   Past Surgical History:   Procedure Laterality Date    ARTHROSCOPY, KNEE      BREAST IMPLANT REVISION       SOCHX:  reports that she has never smoked. She has never used smokeless tobacco. She reports that she does not currently use alcohol. She reports that she does not use drugs.    FH: Per HPI as applicable/pertinent.    Medications, Allergies, and current problem list reviewed today in Epic.     Objective:     /78 (BP Location: Left arm, Patient Position: Sitting, BP Cuff Size: Adult long)   Pulse 92   Temp 36.2 °C (97.2 °F) (Temporal)   Resp 19   Ht 1.651 m (5' 5\")   Wt 100 kg (221 lb 1.9 oz)   SpO2 96%     Physical Exam  Vitals reviewed.   Constitutional:       General: She is not in acute distress.     Appearance: She is normal weight. She is not ill-appearing.   HENT:      Head: Normocephalic and atraumatic.      Right Ear: Tympanic membrane, ear canal and external ear normal.      Left Ear: Tympanic membrane, ear canal and external ear normal.      Nose: Congestion and rhinorrhea present.      Right Sinus: Maxillary sinus tenderness and frontal sinus tenderness present.      Left Sinus: Maxillary sinus tenderness and frontal sinus tenderness present.      Mouth/Throat:      Mouth: Mucous membranes are moist.      Pharynx: Posterior oropharyngeal erythema present. No oropharyngeal exudate.   Eyes:      General:         Right eye: No discharge.         Left eye: No discharge.      Pupils: Pupils are equal, round, and reactive to light.   Cardiovascular:      Rate and Rhythm: Normal rate.   Pulmonary:      Effort: Pulmonary effort is normal. No " respiratory distress.      Breath sounds: No stridor. No wheezing, rhonchi or rales.   Chest:      Chest wall: No tenderness.   Musculoskeletal:      Cervical back: Normal range of motion. No rigidity or tenderness.   Lymphadenopathy:      Cervical: No cervical adenopathy.   Neurological:      Mental Status: She is alert.         Assessment/Plan:     Diagnosis and associated orders:     1. Acute bacterial rhinosinusitis  - doxycycline (VIBRAMYCIN) 100 MG Tab; Take 1 Tablet by mouth 2 times a day.  Dispense: 14 Tablet; Refill: 0  - fluticasone (FLONASE) 50 MCG/ACT nasal spray; Administer 1 Spray into affected nostril(S) every day.  Dispense: 16 g; Refill: 0     Comments/MDM:     Patient history and physical exam are consistent with acute bacterial rhinosinusitis.  Patient was recently ill approximately 1 week ago, high suspicion for superimposed bacterial infection.  Overall presents well in clinic no red flag symptoms endorsed by patient understand on physical exam  Outpatient management will consist of doxycycline twice daily x 7 days as patient has documented penicillin allergy, Flonase for rhinorrhea and sinus decongestion, copious fluids and electrolytes, Tylenol/ibuprofen as staggered schedule  Follow up in 3-5 days if no improvement in symptoms           Differential diagnosis, natural history, supportive care, and indications for immediate follow-up discussed.    Advised the patient to follow-up with the primary care physician for recheck, reevaluation, and consideration of further management.    Please note that this dictation was created using voice recognition software. I have made a reasonable attempt to correct obvious errors, but I expect that there are errors of grammar and possibly content that I did not discover before finalizing the note.    This note was electronically signed by PRISCILLA Chilel

## 2024-12-27 ENCOUNTER — APPOINTMENT (OUTPATIENT)
Dept: DERMATOLOGY | Facility: IMAGING CENTER | Age: 59
End: 2024-12-27
Payer: COMMERCIAL

## 2024-12-27 DIAGNOSIS — L72.3 SEBACEOUS CYST: ICD-10-CM

## 2024-12-27 DIAGNOSIS — D17.9 LIPOMA, UNSPECIFIED SITE: ICD-10-CM

## 2024-12-27 PROCEDURE — 99213 OFFICE O/P EST LOW 20 MIN: CPT | Performed by: DERMATOLOGY

## 2024-12-27 NOTE — PROGRESS NOTES
CC: Other (Cyst )     Subjective: Previously seen patient here for cyst of concern on her lt shoulder it started out small and it got big went to  got it drained. Also has bump on her RT back thigh     History of skin cancer: No  History of precancers/actinic keratoses: No  History of biopsies:No  History of blistering/severe sunburns:No  Family history of skin cancer: Grandmother in 80s-90s  Family history of atypical moles: Grandmother  Wears sunprotection regularly.    ROS: no fevers/chills. No itch.  No cough  Relevant PMH:allergies  Social:NS    PE: Gen:WDWN female in NAD. Skin: focal exam: left shoulder - scar present with minimal subcut thickening noted. No discerable cystic nodule. Punctum visible.  Post right thigh - subcut broad NTTP thickening.        A/P:lipoma, likely post r thigh:  -rtc PRN growth/changes/suspicious features    Hx of cyst/STI, left shoulder, s/p I&D:appearing improved today  -reviewed trx - hibiclens as body wash and warm compresses if recurs  -reviewed indications for surgical management PRN  -f/u PRN recurrence/worsening      F/u 4-6 months/PRN    I have reviewed medications relevant to my specialty.

## 2025-03-19 ENCOUNTER — HOSPITAL ENCOUNTER (OUTPATIENT)
Dept: RADIOLOGY | Facility: MEDICAL CENTER | Age: 60
End: 2025-03-19
Attending: FAMILY MEDICINE
Payer: COMMERCIAL

## 2025-03-19 DIAGNOSIS — D48.19 OTHER SPECIFIED NEOPLASM OF UNCERTAIN BEHAVIOR OF CONNECTIVE AND OTHER SOFT TISSUE: ICD-10-CM

## 2025-03-19 PROCEDURE — 76882 US LMTD JT/FCL EVL NVASC XTR: CPT | Mod: RT

## 2025-05-02 ENCOUNTER — OFFICE VISIT (OUTPATIENT)
Dept: DERMATOLOGY | Facility: IMAGING CENTER | Age: 60
End: 2025-05-02
Payer: COMMERCIAL

## 2025-05-02 DIAGNOSIS — L73.8 SEBACEOUS HYPERPLASIA: ICD-10-CM

## 2025-05-02 DIAGNOSIS — D17.9 LIPOMA, UNSPECIFIED SITE: ICD-10-CM

## 2025-05-02 PROCEDURE — 99213 OFFICE O/P EST LOW 20 MIN: CPT | Performed by: DERMATOLOGY

## 2025-05-02 NOTE — PROGRESS NOTES
CC: Follow-Up    Subjective: Previously seen patient here for f/u  bump on her RT back thigh   And new concerns not previously reviewed:     HPI/location: bump  on lt foot   Time present: 1 year   Painful lesion: No  Itching lesion: No  Enlarging lesion: No  Anything make it better or worse?      HPI/location: spot on lt jaw  Time present: 1 year   Painful lesion: No  Itching lesion: No  Enlarging lesion: No  Anything make it better or worse?    History of skin cancer: No  History of precancers/actinic keratoses: No  History of biopsies:No  History of blistering/severe sunburns:No  Family history of skin cancer: Grandmother in 80s-90s  Family history of atypical moles: Grandmother  Wears sunprotection regularly.    ROS: no fevers/chills. No itch.  No cough  Relevant PMH:allergies  Social:NS    PE: Gen:WDWN female in NAD. Skin: focal exam: r thigh not examined today. Left dorsal/lateral foot - ill defined soft subcut tumor without surface change noted. Left jaw - yellow-hued papule, dell in center    Imaging: U/S  5.4 cm poorly defined isoechoic structure within the posterior right thigh. Incompletely characterized. Depending on clinical findings, MRI would be a consideration for further evaluation.     A/P:lipoma, likely post r thigh:  -rtc PRN growth/changes/suspicious features  -to undergo MRI May 2025    Likely lipoma, left foot:  -b/r  -advised Podiatry eval if grows/changes and/or surgery desired    SH, face:  -b/r    F/u June 2025   I have reviewed medications relevant to my specialty.

## 2025-05-19 ENCOUNTER — HOSPITAL ENCOUNTER (OUTPATIENT)
Dept: RADIOLOGY | Facility: MEDICAL CENTER | Age: 60
End: 2025-05-19
Attending: FAMILY MEDICINE
Payer: COMMERCIAL

## 2025-05-19 DIAGNOSIS — D48.19 OTHER SPECIFIED NEOPLASM OF UNCERTAIN BEHAVIOR OF CONNECTIVE AND OTHER SOFT TISSUE: ICD-10-CM

## 2025-05-19 PROCEDURE — A9579 GAD-BASE MR CONTRAST NOS,1ML: HCPCS | Mod: JZ | Performed by: FAMILY MEDICINE

## 2025-05-19 PROCEDURE — 73720 MRI LWR EXTREMITY W/O&W/DYE: CPT | Mod: RT

## 2025-05-19 PROCEDURE — 700117 HCHG RX CONTRAST REV CODE 255: Mod: JZ | Performed by: FAMILY MEDICINE

## 2025-05-19 RX ADMIN — GADOTERIDOL 20 ML: 279.3 INJECTION, SOLUTION INTRAVENOUS at 08:28

## 2025-06-12 ENCOUNTER — OFFICE VISIT (OUTPATIENT)
Dept: DERMATOLOGY | Facility: IMAGING CENTER | Age: 60
End: 2025-06-12
Payer: COMMERCIAL

## 2025-06-12 DIAGNOSIS — I83.90: Primary | ICD-10-CM

## 2025-06-12 PROCEDURE — 99213 OFFICE O/P EST LOW 20 MIN: CPT | Performed by: DERMATOLOGY

## 2025-06-16 ENCOUNTER — TELEPHONE (OUTPATIENT)
Dept: DERMATOLOGY | Facility: IMAGING CENTER | Age: 60
End: 2025-06-16
Payer: COMMERCIAL

## 2025-06-16 NOTE — TELEPHONE ENCOUNTER
Left 2 voicemails, 06/13/25 and 6/16/2025 and sent a [employer] message to give referral information. No call back from patient.

## 2025-06-26 DIAGNOSIS — I83.811 VARICOSE VEINS OF RIGHT LOWER EXTREMITY WITH PAIN: Primary | ICD-10-CM
